# Patient Record
Sex: MALE | Race: WHITE
[De-identification: names, ages, dates, MRNs, and addresses within clinical notes are randomized per-mention and may not be internally consistent; named-entity substitution may affect disease eponyms.]

---

## 2019-03-19 ENCOUNTER — HOSPITAL ENCOUNTER (INPATIENT)
Dept: HOSPITAL 35 - ER | Age: 74
LOS: 3 days | Discharge: NURSING, REHAB OR LONG-TERM CARE | DRG: 469 | End: 2019-03-22
Attending: INTERNAL MEDICINE | Admitting: INTERNAL MEDICINE
Payer: COMMERCIAL

## 2019-03-19 VITALS — SYSTOLIC BLOOD PRESSURE: 118 MMHG | DIASTOLIC BLOOD PRESSURE: 57 MMHG

## 2019-03-19 VITALS — SYSTOLIC BLOOD PRESSURE: 125 MMHG | DIASTOLIC BLOOD PRESSURE: 61 MMHG

## 2019-03-19 VITALS — DIASTOLIC BLOOD PRESSURE: 51 MMHG | SYSTOLIC BLOOD PRESSURE: 110 MMHG

## 2019-03-19 VITALS — HEIGHT: 70.98 IN | BODY MASS INDEX: 22.26 KG/M2 | WEIGHT: 159 LBS

## 2019-03-19 VITALS — SYSTOLIC BLOOD PRESSURE: 135 MMHG | DIASTOLIC BLOOD PRESSURE: 61 MMHG

## 2019-03-19 DIAGNOSIS — Z47.89: ICD-10-CM
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
## 2019-03-19 DIAGNOSIS — M21.151: ICD-10-CM
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
## 2019-03-19 DIAGNOSIS — D62: ICD-10-CM

## 2019-03-19 DIAGNOSIS — K59.00: ICD-10-CM

## 2019-03-19 DIAGNOSIS — G62.9: ICD-10-CM

## 2019-03-19 DIAGNOSIS — Z98.41: ICD-10-CM
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
## 2019-03-19 DIAGNOSIS — E43: ICD-10-CM

## 2019-03-19 DIAGNOSIS — E55.9: ICD-10-CM

## 2019-03-19 DIAGNOSIS — S72.001A: Primary | ICD-10-CM

## 2019-03-19 DIAGNOSIS — Z85.46: ICD-10-CM

## 2019-03-19 LAB
ANION GAP SERPL CALC-SCNC: 9 MMOL/L (ref 7–16)
BASOPHILS NFR BLD AUTO: 0.6 % (ref 0–2)
BUN SERPL-MCNC: 25 MG/DL (ref 7–18)
CALCIUM SERPL-MCNC: 8.9 MG/DL (ref 8.5–10.1)
CHLORIDE SERPL-SCNC: 103 MMOL/L (ref 98–107)
CO2 SERPL-SCNC: 26 MMOL/L (ref 21–32)
CREAT SERPL-MCNC: 0.9 MG/DL (ref 0.7–1.3)
EOSINOPHIL NFR BLD: 1.2 % (ref 0–3)
ERYTHROCYTE [DISTWIDTH] IN BLOOD BY AUTOMATED COUNT: 14.2 % (ref 10.5–14.5)
GLUCOSE SERPL-MCNC: 145 MG/DL (ref 74–106)
GRANULOCYTES NFR BLD MANUAL: 66.7 % (ref 36–66)
HCT VFR BLD CALC: 40.2 % (ref 42–52)
HGB BLD-MCNC: 13.6 GM/DL (ref 14–18)
LYMPHOCYTES NFR BLD AUTO: 25.6 % (ref 24–44)
MCH RBC QN AUTO: 31.5 PG (ref 26–34)
MCHC RBC AUTO-ENTMCNC: 33.8 G/DL (ref 28–37)
MCV RBC: 93 FL (ref 80–100)
MONOCYTES NFR BLD: 5.9 % (ref 1–8)
NEUTROPHILS # BLD: 3.5 THOU/UL (ref 1.4–8.2)
PLATELET # BLD: 188 THOU/UL (ref 150–400)
POTASSIUM SERPL-SCNC: 4.5 MMOL/L (ref 3.5–5.1)
RBC # BLD AUTO: 4.32 MIL/UL (ref 4.5–6)
SODIUM SERPL-SCNC: 138 MMOL/L (ref 136–145)
WBC # BLD AUTO: 5.3 THOU/UL (ref 4–11)

## 2019-03-19 PROCEDURE — 50101: CPT

## 2019-03-19 PROCEDURE — 10084: CPT

## 2019-03-19 PROCEDURE — 50382 CHANGE URETER STENT PERCUT: CPT

## 2019-03-19 PROCEDURE — 70005: CPT

## 2019-03-19 PROCEDURE — 56528: CPT

## 2019-03-19 PROCEDURE — 53078: CPT

## 2019-03-19 PROCEDURE — 56524: CPT

## 2019-03-19 PROCEDURE — 50414: CPT

## 2019-03-19 PROCEDURE — 53000 INCISION OF URETHRA: CPT

## 2019-03-19 PROCEDURE — 53369: CPT

## 2019-03-19 PROCEDURE — 62110: CPT

## 2019-03-19 PROCEDURE — 56530: CPT

## 2019-03-19 PROCEDURE — 55430: CPT

## 2019-03-19 PROCEDURE — 15002 WOUND PREP TRK/ARM/LEG: CPT

## 2019-03-19 PROCEDURE — 57095: CPT

## 2019-03-19 PROCEDURE — 57103: CPT

## 2019-03-19 PROCEDURE — 62900: CPT

## 2019-03-19 PROCEDURE — 0SRR019 REPLACEMENT OF RIGHT HIP JOINT, FEMORAL SURFACE WITH METAL SYNTHETIC SUBSTITUTE, CEMENTED, OPEN APPROACH: ICD-10-PCS | Performed by: ORTHOPAEDIC SURGERY

## 2019-03-19 PROCEDURE — 50010 RENAL EXPLORATION: CPT

## 2019-03-19 NOTE — NUR
ASSUMED CARE AT 1500, SHIFT ASSESSMENT DONE, ADMISSION COMPLETED. NPO SINCE
THIS AM. CAME IN WITH RIGHT HIP FRACTURE. LEFT FOR SURGERY AT 1545. Passed

## 2019-03-20 VITALS — SYSTOLIC BLOOD PRESSURE: 109 MMHG | DIASTOLIC BLOOD PRESSURE: 55 MMHG

## 2019-03-20 VITALS — DIASTOLIC BLOOD PRESSURE: 65 MMHG | SYSTOLIC BLOOD PRESSURE: 129 MMHG

## 2019-03-20 VITALS — SYSTOLIC BLOOD PRESSURE: 132 MMHG | DIASTOLIC BLOOD PRESSURE: 58 MMHG

## 2019-03-20 VITALS — SYSTOLIC BLOOD PRESSURE: 124 MMHG | DIASTOLIC BLOOD PRESSURE: 55 MMHG

## 2019-03-20 LAB
ANION GAP SERPL CALC-SCNC: 9 MMOL/L (ref 7–16)
BASOPHILS NFR BLD AUTO: 0.1 % (ref 0–2)
BUN SERPL-MCNC: 25 MG/DL (ref 7–18)
CALCIUM SERPL-MCNC: 7.9 MG/DL (ref 8.5–10.1)
CHLORIDE SERPL-SCNC: 100 MMOL/L (ref 98–107)
CO2 SERPL-SCNC: 26 MMOL/L (ref 21–32)
CREAT SERPL-MCNC: 1.1 MG/DL (ref 0.7–1.3)
EOSINOPHIL NFR BLD: 0 % (ref 0–3)
ERYTHROCYTE [DISTWIDTH] IN BLOOD BY AUTOMATED COUNT: 13.9 % (ref 10.5–14.5)
GLUCOSE SERPL-MCNC: 168 MG/DL (ref 74–106)
GRANULOCYTES NFR BLD MANUAL: 86.1 % (ref 36–66)
HCT VFR BLD CALC: 32 % (ref 42–52)
HGB BLD-MCNC: 10.9 GM/DL (ref 14–18)
LYMPHOCYTES NFR BLD AUTO: 6 % (ref 24–44)
MAGNESIUM SERPL-MCNC: 1.8 MG/DL (ref 1.8–2.4)
MCH RBC QN AUTO: 31.7 PG (ref 26–34)
MCHC RBC AUTO-ENTMCNC: 34.2 G/DL (ref 28–37)
MCV RBC: 92.7 FL (ref 80–100)
MONOCYTES NFR BLD: 7.8 % (ref 1–8)
NEUTROPHILS # BLD: 6.9 THOU/UL (ref 1.4–8.2)
PLATELET # BLD: 173 THOU/UL (ref 150–400)
POTASSIUM SERPL-SCNC: 4.4 MMOL/L (ref 3.5–5.1)
RBC # BLD AUTO: 3.45 MIL/UL (ref 4.5–6)
SODIUM SERPL-SCNC: 135 MMOL/L (ref 136–145)
WBC # BLD AUTO: 8.1 THOU/UL (ref 4–11)

## 2019-03-20 NOTE — NUR
PT RETURNED SX 2105. VSS. PT ALERT AND ORIENTED. ASSESSMENT COMPLETED. PT
DENEIS N/V AT THIS TIME. PT REPORTS MINIMAL PAIN, SEE EMAR. ORDERS
IMPLEMENTED. ABDUCTOR PILLOW IN PLACE. OXYGEN VIA NC AT 2L FOR COMFORT. ASTON
DRESSING TO R HIP, DRESSING C/D/I. NEUROCHECKS TO R LE GOOD. PT CALL LIGTH AND
PERSONAL BELONGINGS WITHIN REACH. WILL CONTINUE POC UNTIL EOS.

## 2019-03-20 NOTE — NUR
ASSESSMENT-PT LIVES AT HOME WITH HIS WIFE WHO HAD A TKA DONE 6/2018.  PT WALKS
ON HIS OWN AND DOES HIS OWN ADLS.  THEY HAVE A 2 WHEELED WALKER, CANE AND BSC
AND SHOWER BENCH AT HOME IF NEEDED.  PT HAS NOT HAD ANY HH SERVICES.  THEY
HAVE 2 SONS - [place] AND [place].  HE DOES THE CLEANING, THEY SHARE
THE COOKING AND SHE DOES THE LAUNDRY.  PT HAS GONE TO OUTPT THERAPY IN THE
PAST.  WILL AWAIT THERAPY REC.  FOLLOWING TO ASSIST WITH DC PLANNING.

## 2019-03-21 VITALS — DIASTOLIC BLOOD PRESSURE: 71 MMHG | SYSTOLIC BLOOD PRESSURE: 138 MMHG

## 2019-03-21 VITALS — SYSTOLIC BLOOD PRESSURE: 138 MMHG | DIASTOLIC BLOOD PRESSURE: 71 MMHG

## 2019-03-21 LAB
ERYTHROCYTE [DISTWIDTH] IN BLOOD BY AUTOMATED COUNT: 14.1 % (ref 10.5–14.5)
HCT VFR BLD CALC: 29.6 % (ref 42–52)
HGB BLD-MCNC: 10.1 GM/DL (ref 14–18)
MCH RBC QN AUTO: 31.4 PG (ref 26–34)
MCHC RBC AUTO-ENTMCNC: 34 G/DL (ref 28–37)
MCV RBC: 92.4 FL (ref 80–100)
PLATELET # BLD: 148 THOU/UL (ref 150–400)
RBC # BLD AUTO: 3.21 MIL/UL (ref 4.5–6)
WBC # BLD AUTO: 10.2 THOU/UL (ref 4–11)

## 2019-03-21 NOTE — NUR
PATIENT CARES WERE ASSUMED AT APPROX 2000. PATIENT WAS ASSESSED AND MEDS WERE
PASSED. PATIENT HAD A HARD TIME GETTING COMFORTABLE. CALLED TO CHANGE OUT HIS
BED FOR A FLOOR BED TO GET HIS LEG HIGHER. MCKENNA WAS D/C AT 0600. PATIENT HAD
SEVERAL COMPLAINTS THROUGH THE NIGHT. LAST COMPLAINT WAS THE TELEPHONE. HOURLY
ROUNDING WAS DONE. PATIENT APPERED TO BE SLEEPING MOST OF THE TIMES AT ROUNDS.
PATIENT CONTINUES TO TAKE ONLY TYLENOL FOR PAIN. THE BED IS IN LOW LOCKED
POSITION AND THE WAY PATIENT HAS REQUESTED. BED ALARM IS ON.

## 2019-03-21 NOTE — NUR
on-going assessment: cm reviewed chart and met with pt at the bedside. CM
DISCUSSED HH VS SNF WITH PATIENT. PT STATING HE DOES NOT WANT TO GO TO A SNF
AND PREFERS TO GO BACK HOME STATNIG HIS WIFE IS VERY SUPPORTIVE AND SO IS HIS
FAMILY. PT STATES SHE PREFERS TO GO HOME WITH HH BUT WILL SEE HOW HE IS DOING
IN THE NEXT DAY. PT STATING HE IS STILL HAVING QUITE A BIT OF PAIN. CM
DICUSSED BENEFITS OF HH VS SNF AND PT PREFERS TO GO HOME WITH HH. CM WILL
FOLLOW UP TO ASSIST AS NEEDED.

## 2019-03-21 NOTE — NUR
ASSUMED PATIENT AND CARES AT 0715, PATIENT WOKE LYING IN BED REQUESTING TO USE
THE BATHROOM, NURSE ASSISTED PATIENT UP TO STANDING PER WALKER TO THE BSC,
NURSE THEN ASSISTED PATIENT BACK TO BED, PATIENT A&OX4, PAIN 6/10 AT THIS
TIME, LEFT HAND IV INTACT AND PATENT WITH NS@100ML/HR INFUSING, ASTON DRESSING
INTACT TO RIGHT HIP, O2@2L/NC, FALL PRECAUTIONS IN PLACE, PERSONAL BELONGINGS
AND CALL LIGHT IN REACH, WILL CONTINUE TO MONITOR

## 2019-03-22 ENCOUNTER — HOSPITAL ENCOUNTER (INPATIENT)
Dept: HOSPITAL 35 - REHABU | Age: 74
LOS: 6 days | Discharge: HOME HEALTH | DRG: 536 | End: 2019-03-28
Attending: PHYSICAL MEDICINE & REHABILITATION | Admitting: PHYSICAL MEDICINE & REHABILITATION
Payer: COMMERCIAL

## 2019-03-22 VITALS — SYSTOLIC BLOOD PRESSURE: 136 MMHG | DIASTOLIC BLOOD PRESSURE: 68 MMHG

## 2019-03-22 VITALS — HEIGHT: 71 IN | WEIGHT: 164 LBS | BODY MASS INDEX: 22.96 KG/M2

## 2019-03-22 VITALS — SYSTOLIC BLOOD PRESSURE: 130 MMHG | DIASTOLIC BLOOD PRESSURE: 64 MMHG

## 2019-03-22 VITALS — SYSTOLIC BLOOD PRESSURE: 128 MMHG | DIASTOLIC BLOOD PRESSURE: 68 MMHG

## 2019-03-22 DIAGNOSIS — Z90.79: ICD-10-CM

## 2019-03-22 DIAGNOSIS — R32: ICD-10-CM

## 2019-03-22 DIAGNOSIS — E55.9: ICD-10-CM

## 2019-03-22 DIAGNOSIS — D62: ICD-10-CM

## 2019-03-22 DIAGNOSIS — F43.29: ICD-10-CM
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
## 2019-03-22 DIAGNOSIS — R53.81: ICD-10-CM

## 2019-03-22 DIAGNOSIS — G62.9: ICD-10-CM

## 2019-03-22 DIAGNOSIS — G31.84: ICD-10-CM
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
## 2019-03-22 DIAGNOSIS — R33.9: ICD-10-CM

## 2019-03-22 DIAGNOSIS — S72.001A: Primary | ICD-10-CM
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
## 2019-03-22 DIAGNOSIS — Z85.46: ICD-10-CM
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
## 2019-03-22 LAB
ERYTHROCYTE [DISTWIDTH] IN BLOOD BY AUTOMATED COUNT: 14.2 % (ref 10.5–14.5)
HCT VFR BLD CALC: 26.6 % (ref 42–52)
HGB BLD-MCNC: 9.1 GM/DL (ref 14–18)
MCH RBC QN AUTO: 31.6 PG (ref 26–34)
MCHC RBC AUTO-ENTMCNC: 34.1 G/DL (ref 28–37)
MCV RBC: 92.7 FL (ref 80–100)
PLATELET # BLD: 147 THOU/UL (ref 150–400)
RBC # BLD AUTO: 2.87 MIL/UL (ref 4.5–6)
WBC # BLD AUTO: 8.2 THOU/UL (ref 4–11)

## 2019-03-22 PROCEDURE — 10112: CPT

## 2019-03-22 NOTE — NUR
PATIENT DISCHARGED TO  INPATIENT REHAB  FROM [facility], PATIENT AWARE
OF DISCHARGE, PERSONAL BELONGINGS PACKED AND WILL BE TRANSFERRED WITH PATIENT,
PATIENT REFUSED TO GET DRESSED, REPORT GIVEN TO RECEIVING NURSE, DISCHARGE
PACKET PRINTED AND WILL BE SENT WITH PATIENT, TRANSPORT TO TAKE PATIENT AND
BELONGINGS TO

## 2019-03-22 NOTE — NUR
ASSUMED PATIENT AND CARES AT 0715, PATIENT WOKE IN BED REQUESTING TO USE
BATHROOM, PATIENT UP X1 ASSIST PER WALKER WITH TRANSFERS, SBA WITH AMBULATION,
LEFT HAND IV INTACT AND PATENT FLUIDS INFUSING, ASTON DRESSING TO RIGHT HIP
C/D/I, PAIN 3/10, PERSONAL BELONGINGS AND CALL LIGHT IN REACH, WILL CONTINUE
TO MONITOR

## 2019-03-22 NOTE — NUR
PATIENTS CARES WERE ASSUMED AT SHIFT CHANGE.PATIENT WAS ASSESSED ANDMEDS WERE
PASSED. PATIENT ONCE AGAIN BECAME UNHAPPY WITH THE BED HE IS IN. PATIENT
WANTED THIS NURSE TO KEEP HIM UP HIGH AND HAD TO SEND IN CHARGE NURSE AND CALL
THE HOUSE SUPERVISER TO GET THE BED DOWN IN A RESONABLE POSITION. HOURLY
ROUNDING WAS DONE AND PATIENT DID APPER TO BE SLEEPING MOST OF THIS SHIFT. THE
BED IS IN A LOW AND LOCKED POSITION, THE BED ALARM IS ON.

## 2019-03-22 NOTE — NUR
ASSUMED CARE AT APPROX 1500. PATIENT A/O X4. DENIES PAIN. VSS. DRESSING TO
RIGHT HIP C/D/I, ASTON DRESSING IN PLACE. PATIENT EVALUATED BY PHYSICAL
THERAPY. CONSENTS SIGNED. ADMISSION ASSESSMENT COMPLETE. PATIENT ORIENTED TO
ROOM AND REHAB UNIT. MED ORDERS FAXED TO PHARMACY. PATIENT ATE DINNER. USED
CALL LIGHT APPROPRIATELY. FALL PRECAUTIONS IN PLACE. EDUCATED ABOUT HIP
PRECAUTIONS. UP X1 GB AND WALKER TO TOILET. COMPLETED HYGIENE INDEPENDENTLY.
RESTING IN BED AT CHANGE OF SHIFT.

## 2019-03-22 NOTE — NUR
ON-GOING ASSESSMENT: CM REVIEWED CHART. [facility]N CONSULT WAS PLACED FOR PATIENT
PRIOR TO DISCHARGING BACK HOME. PT IS AGREEABLE TO GO TO 5N. [facility]N DID EVAL AND
PATIENT IS APPROPRIATE FOR ACUTE REHAB STAY. CM REQUESTED THEY CLARIFY WITH
DR. MIRELES AS PATIENT WILL BE A BUNDLE. PER 5N THEY CHECK WITH PHYSICIAN AND
HE IS AGREEABLE WITH PATIENT TO GO TO 5N. CM SPOKE WITH PATIENTS WIFE TO
UPDATE.

## 2019-03-23 VITALS — DIASTOLIC BLOOD PRESSURE: 69 MMHG | SYSTOLIC BLOOD PRESSURE: 132 MMHG

## 2019-03-23 VITALS — DIASTOLIC BLOOD PRESSURE: 43 MMHG | SYSTOLIC BLOOD PRESSURE: 116 MMHG

## 2019-03-23 LAB
ANION GAP SERPL CALC-SCNC: 6 MMOL/L (ref 7–16)
BUN SERPL-MCNC: 18 MG/DL (ref 7–18)
CALCIUM SERPL-MCNC: 8.3 MG/DL (ref 8.5–10.1)
CHLORIDE SERPL-SCNC: 103 MMOL/L (ref 98–107)
CO2 SERPL-SCNC: 28 MMOL/L (ref 21–32)
CREAT SERPL-MCNC: 0.8 MG/DL (ref 0.7–1.3)
ERYTHROCYTE [DISTWIDTH] IN BLOOD BY AUTOMATED COUNT: 14.4 % (ref 10.5–14.5)
GLUCOSE SERPL-MCNC: 107 MG/DL (ref 74–106)
HCT VFR BLD CALC: 26.9 % (ref 42–52)
HGB BLD-MCNC: 9.1 GM/DL (ref 14–18)
MCH RBC QN AUTO: 31.4 PG (ref 26–34)
MCHC RBC AUTO-ENTMCNC: 33.8 G/DL (ref 28–37)
MCV RBC: 92.9 FL (ref 80–100)
PLATELET # BLD: 186 THOU/UL (ref 150–400)
POTASSIUM SERPL-SCNC: 4.1 MMOL/L (ref 3.5–5.1)
RBC # BLD AUTO: 2.9 MIL/UL (ref 4.5–6)
SODIUM SERPL-SCNC: 137 MMOL/L (ref 136–145)
WBC # BLD AUTO: 7.5 THOU/UL (ref 4–11)

## 2019-03-23 NOTE — NUR
WALKED IN HALLWAY 100 FT WITH WALKER, THEN SAT ON TOILET FOR VOID ATTEMPT AND
WAS UPSET THAT HE HAD ALREADY VOIDED A LARGE AMOUNT. PATIENT SAT ON TOILET 15
MINUTES AND CONTINUED TO DRIBBLE THE ENTIRE TIME. SMALL BM AS WELL, SO HE
DECLINED THE EVENING SENNA AND MIRALAX, DRANK 4 OUNCE OF APPLE JUICE. CHANGED
INTO FRESH UNDERWEAR AND HOSPITAL GOWN FOR THE NIGHT. ABLE TO WASH AND WIPE
HIMSELF, STEWART-CARE FRONT AND BACK WHILE STANDING. AGREES WITH TRAMADOL Q 6
HOURS AND WILL START WITH A DOSE NOW. ASTON DRESSING CDI WITH TUBING INTACT AND
PATENT TO MINIPUMP

## 2019-03-23 NOTE — NUR
ASSUMED CARE AT APPROX 0700. REPORTS SLEEP PATIENT A/O X4. HAS HIP PAIN RATES
PAIN 5/10, NIGHT RN GAVE TRAMADOL AT 5AM AND PT REQUEST ANOTHER PRN TRAMADOL
NOW. DISCUSSED ABOUT IT IS MORE BENEFIT FOR HIM TO BE ON SCHEULED TRAMADOL.
DRESSING TO RIGHT HIP C/D/I, ASTON DRESSING IN PLACE. OT GAVE HIM SPONGE BATH.
REASSESSMENT COMPLETE. PT HAD URINARY RETENTION AND NOT ON FLOMAX. NOTIFIED
DR. SANTA RECEIVED ORDER FOR FLOMAX, STRAIGHT CATH BID PRN IF BS GREATER THAN
500CC AND DR. SANTA WILL CHANGE TRAMADOL PRN TO SCHEDULE. FALL PRECAUTION IN
PLACE.  CALL LIGHT APPROPRIATELY.  EDUCATED ABOUT HIP PRECAUTIONS. UP X1 GB
AND WALKER TO TOILET. PT UP IN DINNING AND EATING LUNCH AT THIS MOMENT. WILL
CONTINUE TO MONITOR.

## 2019-03-23 NOTE — NUR
UP TO TOILET AND UMABLE TO VOID, DISTRESSED ABOUT THIS AND HIS SEDDEN INCREASE
IN LEG PAIN AFTER BEING UP TEN MINUTES TRYING. BLADDER SCAN REVEALS 600. ORDER
FOR STRAIGHT CATH WITH 500 CC RESULTING. NOW WOULD LIKE HIS FULL DOSE OF
TRAMADOL FOR PAIN STILL AT 4/10 AFTER SETTLING BACK IN BED. ALSO WANTS TO TAKE
MIRALAX AND SENNA THAT HE HAD ESSENTIALLY DECLINED LAST NIGHTBY TAKING ONLY
ONE SIP OF MIRALAX IN APPLE JUICE. RIGHT CALF AND HEEL ELEVATED UP ON A BATH
BLANKET.

## 2019-03-23 NOTE — NUR
PIVOT TRANSFER TO STAND, PATIENT IS CAREFUL TO NOT CROSS LEGS. UP TO TOILET
THEN SITS TO VOID SMALL AMOUNT. STATES HUGE BM ON 3/21, DECLINES SENNA AND
TAKES A SIP OF MIRALAX WITH THE IDEA THAT IT IS BEST TAKEN IN THE MORNING WITH
APPLE JUICE. PLEASANT. REFUSES SCDs, INFORMED OF DAILY LOVENOX FOR DECREASED
RISK OF DVT. PATIENT IS ACCEPTING OF AND AWARE OF LOVENOX USE SINCE HIS WIFE
HAD IT BID FOR 3 WEEKS AFTER HER HIP REPLACEMENT. REFUSES ABDUCTOR WEDGE,
UNDERSTANDS HIP PRECAUTIONS AND DOES NOT THINK HE CROSSES HIS LEGS IN HIS
SLEEP.

## 2019-03-24 VITALS — SYSTOLIC BLOOD PRESSURE: 136 MMHG | DIASTOLIC BLOOD PRESSURE: 59 MMHG

## 2019-03-24 VITALS — SYSTOLIC BLOOD PRESSURE: 132 MMHG | DIASTOLIC BLOOD PRESSURE: 60 MMHG

## 2019-03-24 NOTE — NUR
PT ASSESSMENT COMPLETED AND VSS. MEDS GIVEN AS ORDERED AND WELL TOLERATED.
FALL PRECAUTIONS IN PLACE. UP TO THE BATHROOM WITH ASST/GAIT/WALKER AND HIP
PRECAUTIONS. BLADDER SCAN WNL AT THIS TIME. RIGHT HIP DSGS DRY AND INTACT.
WILL CONTINUE TO MONITOR FREQUENTLY.

## 2019-03-24 NOTE — NUR
UP TO TOILET, UNABLE TO VOID. STRAIGHT CATHED  CC KIMBER URINE. TRAMADOL
100 MG GIVEN WITH PLAN TO CONTINUE GIVING Q 6 HOURS. PAIN OF 2/10 INCREASES TO
4/10 WITH THE ACTIVITY OF GOING TO AND SITTING ON THE TOILET.

## 2019-03-24 NOTE — NUR
ASSUMED CARE AT APPROX 0715. PATIENT A/O X4. C/O PAIN IN RIGHT HIP. MEDICATED
FOR PAIN Q6H IN TIME WITH THERPAY. PATIENT PARTICIPATED IN PT, AMBULATED X1
ASSIST GB AND WALKER. CUES FOR HIP PRECAUTIONS. ASTON DRESSING TO RIGHT HIP
C/D/I, ASTON DEVICE ACTIVE. BLADDER SCANNED AT 1000, 207 ML SCANNED, PATIENT
DENIED DISCOMFORT AND NEED TO VOID. PATIENT ATTEMPTED TO VOID AGAIN, SCANNED
AGAIN AT 1200, 420 ML RESIDUAL. REFUSED CATH UNTIL AFTER THERAPY, CATH'D AT
APPROX 1330 - 450 ML OUT. FALL PRECAUTIONS IN PLACE. PATIENT CALLS
APPROPRIATELY FOR ASSISTANCE. RESTING IN BED. WILL CONTINUE TO MONITOR.

## 2019-03-25 VITALS — DIASTOLIC BLOOD PRESSURE: 60 MMHG | SYSTOLIC BLOOD PRESSURE: 146 MMHG

## 2019-03-25 VITALS — DIASTOLIC BLOOD PRESSURE: 58 MMHG | SYSTOLIC BLOOD PRESSURE: 120 MMHG

## 2019-03-25 NOTE — NUR
ADM TYLENOL 325MG 2 TABS PO FOR PAIN TO RT HIP. PT WORRIED ABOUT TRAMADOL IF
IT IS AFFECTING HIS ABILITY TO URINATE. NO URGE TO VOID, AND RETAINING URINE.

## 2019-03-25 NOTE — NUR
BLADDER SCANNED. 473 IN BLADDER. PT GETTING READY TO WORK WITH THERAPY. STATED
HE WILL TRY TO WALK AND MAYBE OPEN UP TO VOID. OXYGEN WAS OFF WHEN PT WENT TO
BATHROOM TO HAVE BM. SAT 85% ON ROOM AIR.

## 2019-03-25 NOTE — NUR
AFTER WORKING WITH THERAPY, BLADDER SCANNED AMOUNT 421ML. PT WANTED TO WAIT
TO BE STRAIGHT UNTIL STRAIGHT CATHED A LITTLE BIT. HE THINKS THAT HE MIGHT
HAVE DRIBBLED A LITTLE WHEN WALKING.

## 2019-03-25 NOTE — NUR
HAVING BREAKFAST IN BED THIS AM. STATED PAIN TO RT HIP IS 3 ON 1-10 SCALE.
OXYGEN ON 2L NC. UP WITH WALKER AND GAIT BELT WITH TRANSFERS AND AMBULATION.

## 2019-03-25 NOTE — NUR
ADM TYLENOL 325MG 2 TABS PO FOR PAIN TO RT HIP OF 3 ON 1-10 SCALE. PT UP TO
BATHROOM FOR X4 BM AT THIS TIME. PT ABLE TO WALK WITH WALKER AND TO RAISE LEGS
INTO BED WITHOUT ASSISTANCE.

## 2019-03-25 NOTE — NUR
THIS AM PTS PVR BLADDER SCAN WAS GREATER THEN 600. STRAIGHT CATH COMPLETED AS
ORDERED. 700 OUT. WILL CONTINUE TO MONITOR.

## 2019-03-26 VITALS — DIASTOLIC BLOOD PRESSURE: 67 MMHG | SYSTOLIC BLOOD PRESSURE: 111 MMHG

## 2019-03-26 VITALS — SYSTOLIC BLOOD PRESSURE: 149 MMHG | DIASTOLIC BLOOD PRESSURE: 64 MMHG

## 2019-03-26 LAB
BILIRUB UR-MCNC: NEGATIVE MG/DL
COLOR UR: YELLOW
KETONES UR STRIP-MCNC: NEGATIVE MG/DL
RBC # UR STRIP: NEGATIVE /UL
SP GR UR STRIP: 1.01 (ref 1–1.03)
URINE CLARITY: CLEAR
URINE GLUCOSE-RANDOM*: NEGATIVE
URINE LEUKOCYTES-REFLEX: NEGATIVE
URINE NITRITE-REFLEX: NEGATIVE
URINE PROTEIN (DIPSTICK): NEGATIVE
UROBILINOGEN UR STRIP-ACNC: 1 E.U./DL (ref 0.2–1)

## 2019-03-26 NOTE — NUR
team meeting, recommendation :  dcp 3/28th with hh ( pt, ot, nursing ), teach
for clean self cath. will cont following as needed for dc needs. pt requested
hospital beds and not recommendation per therapy.

## 2019-03-26 NOTE — NUR
LEFT MESSAGE WITH DR. CHI FREEMAN'S NURSE, REGARDING OUR PLAN TO DC HOME
ON 3/28, DUE TO URINARY RETENTION ISSUES. REQEUSTED A CALL BACK TO VERIFY THAT
THEY RECEIVED THE MESSAGE AND ARE OK WITH THIS PLAN.

## 2019-03-26 NOTE — NUR
RECEIVED REPORT FROM Printed Piece. PT ARRIVEDD TO [facility] AT 5N AROUND 1115. PT REPORTS
SHE HAD ROUGH DRIVE.  PATIENT A/O X4. FORGETFUL. DENIES PAIN. VSS. DRESSING TO
RIGHT HIP C/D/I, ASTON DRESSING IN PLACE. PATIENT EVALUATED BY PHYSICAL
THERAPY. CONSENTS SIGNED. ADMISSION ASSESSMENT COMPLETE. PATIENT ORIENTED TO
ROOM AND REHAB UNIT. MED ORDERS FAXED TO PHARMACY. PATIENT ATE DINNER. USED
CALL LIGHT APPROPRIATELY. FALL PRECAUTIONS IN PLACE. EDUCATED ABOUT HIP
PRECAUTIONS. UP X1 GB AND WALKER TO TOILET. COMPLETED HYGIENE INDEPENDENTLY.
RESTING IN BED AT CHANGE OF SHIFT.

## 2019-03-26 NOTE — NUR
ASSUME PT CARE AT 0700. VSS ON ROOM AIR. SAT 92% ON RA. HR CAN BE HIGH AT
TIME. ENCOURAGED DEEP BREATHING. PT SAID HE HAD 5X BM LAST NIGHT. DENIES
MIRALAX AND SENNA SCHEUDULE THIS AM. PT C/O INDIGESTION, BELCHING. NOTIFIED
REKHA AND OBTAINED ORDER FOR TUMB PRN. DISCUSSION PLANS WITH PT, PT REFUSED
FLOMAX THIS AM. ENVEN HE HAS URINARY RETENTION. ENCOURAGED PT TO DISCUSS WITH
NP. FALL PRECAUTION IN PLACE. WILL CONTINUE TO MONITOR.

## 2019-03-27 VITALS — DIASTOLIC BLOOD PRESSURE: 53 MMHG | SYSTOLIC BLOOD PRESSURE: 138 MMHG

## 2019-03-27 VITALS — SYSTOLIC BLOOD PRESSURE: 124 MMHG | DIASTOLIC BLOOD PRESSURE: 53 MMHG

## 2019-03-27 VITALS — DIASTOLIC BLOOD PRESSURE: 53 MMHG | SYSTOLIC BLOOD PRESSURE: 124 MMHG

## 2019-03-27 NOTE — NUR
AGAIN UNABLE TO VOID AT MIDNIGHT. STRAIGHT CATHED  CC CLEAR YELLOW
URINE. PATIENT THREADED CATHETER AFTER INSERTED AND AGREED THAT IT MIGHT BE
MORE COMFORTABLE TO DO HIMSELF THAN TO HAVE SOMEONE ELSE DO IT. STATES HE IS
OK WITH STRAIGHT CATHING AT HOME IF NECESSARY BY THE TIME HE GOES HOME BUT
STATES THAT HIS WIFE MIGHT HAVE TO DO IT FOR HIM. HIS PAIN IS MANAGEABLE, BUT
HE C/O INDIGESTION, LEG CRAMPING, DRY MOUTH, AND, OF COURSE, THE URINARY
RETENTION. MYLICON AND TUMS PROVIDED SOME SYMPTOMATIC RELIEF

## 2019-03-27 NOTE — NUR
PT ASSESSMENT COMPLETED AND VSS. MEDS GIVEN AS ORDERED AND WELL TOLERATED.
FALL PRECAUTIONS IN PLACE. UP TO THE BATHROOM WITH ASST/GAIT/WALKER. STEADY.
DRSG ON R HIP DRY AND INTACT. PT DENIES PAIN AT THIS TIME. SLEEPING WELL. WILL
CONTINUE TO MONITOR FREQUENTLY.

## 2019-03-27 NOTE — NUR
ASSUME PT CARE AT 0700. REPORTS SLEPT GOOD LAST NIGHT. VSS ON ROOM AIR. SAT
98% ON RA.  MAY BE D/T ANXIETY ENCOURAGED DEEP BREATHING. PT HAS URINARY
RETENSION. ASSISTED TO BATHROOM THIS AM HAD GOOD BM URINATE SOME. BS HAS 363CC
NOW. PT SAID HE WILL TRY TO GO TO BATHROOM LATER. C/O RIGHT HIP PAIN DURING
THERAPY GAVE PRN TYLENOL. JAN TIRED NOW. SLEEP SOUNDLY IN BED. WILL GO TO
[place] LATER WITH GROUP. DENIES ABD DISCOMFORT TODAY. OT GAVE PT SHOWER
THIS AM. ASTON DRESSING INTACT, NOTICED GREEN LIGHT IS OFF.  FALL PRECAUTION IN
PLACE. WILL CONTINUE TO MONITOR.

## 2019-03-28 VITALS — SYSTOLIC BLOOD PRESSURE: 124 MMHG | DIASTOLIC BLOOD PRESSURE: 53 MMHG

## 2019-03-28 VITALS — SYSTOLIC BLOOD PRESSURE: 134 MMHG | DIASTOLIC BLOOD PRESSURE: 55 MMHG

## 2019-03-28 NOTE — NUR
ASSUMED CARES AT 0700. PT AWAKE, ALERT AND ORIENTED*4. DENIES PAIN AT THIS
TIME. RIGHT HIP DRESSING REMAINS DRY AND INTACT, MILD BRUISING REMAINS ON
RIGHT KNEE BACK AND AROUND THE INCISION. VITALS REMAINED STABLE. PT VOIDING
REGULARLY. PT UP WITH 1 PERSON MIN ASSIST, GAITBELT AND WALKER, WEIGHT BEARING
AS TOLERATED ON RLE. PT DISCHARGE TEACHING COMPLETED AND PT VERBALISED
UNDERSTANDING. Q1H VISUAL CHECKS. CALL LIGHT WITHIN REACH. FALL PRECAUTIONS IN
PLACE

## 2019-03-28 NOTE — NUR
PT BLADDER SCAN THIS MORNING WAS WNL. PVR . PT HAS BEEN STRAIGHT CATH A
FEW TIMES OVER THE PAST SEVERAL DAYS. PLAN IS FOR PT TO GO HOME TODAY AT 10
AM. NO PLAN IN PLACE AT THIS TIME FOR HOME STRAIGHT CATH. PT STATES THAT HE
HAS HAD TO STRAIGHT CATH IN THE PAST AND KNOWS HOW TO DO IT. WILL TALK TO DAY
RN ABOUT THIS CONCERN ESPECIALLY WITH D/C PLAN FOR THIS MORNING.

## 2020-01-04 ENCOUNTER — HOSPITAL ENCOUNTER (EMERGENCY)
Dept: HOSPITAL 35 - ER | Age: 75
Discharge: HOME | End: 2020-01-04
Payer: COMMERCIAL

## 2020-01-04 VITALS — HEIGHT: 71 IN | WEIGHT: 164.99 LBS | BODY MASS INDEX: 23.1 KG/M2

## 2020-01-04 VITALS — DIASTOLIC BLOOD PRESSURE: 76 MMHG | SYSTOLIC BLOOD PRESSURE: 135 MMHG
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
## 2020-01-04 DIAGNOSIS — Z85.46: ICD-10-CM

## 2020-01-04 DIAGNOSIS — S32.028A: Primary | ICD-10-CM
